# Patient Record
Sex: FEMALE | URBAN - METROPOLITAN AREA
[De-identification: names, ages, dates, MRNs, and addresses within clinical notes are randomized per-mention and may not be internally consistent; named-entity substitution may affect disease eponyms.]

---

## 2020-05-13 ENCOUNTER — APPOINTMENT (RX ONLY)
Dept: URBAN - METROPOLITAN AREA CLINIC 45 | Facility: CLINIC | Age: 45
Setting detail: DERMATOLOGY
End: 2020-05-13

## 2020-05-13 DIAGNOSIS — L65.8 OTHER SPECIFIED NONSCARRING HAIR LOSS: ICD-10-CM

## 2020-05-13 DIAGNOSIS — L63.8 OTHER ALOPECIA AREATA: ICD-10-CM

## 2020-05-13 PROCEDURE — ? TREATMENT REGIMEN

## 2020-05-13 PROCEDURE — ? COUNSELING

## 2020-05-13 PROCEDURE — ? PRESCRIPTION

## 2020-05-13 PROCEDURE — 99203 OFFICE O/P NEW LOW 30 MIN: CPT

## 2020-05-13 RX ORDER — BIMATOPROST 0.3 MG/ML
SOLUTION/ DROPS OPHTHALMIC
Qty: 1 | Refills: 3 | Status: ERX | COMMUNITY
Start: 2020-05-13

## 2020-05-13 RX ADMIN — BIMATOPROST: 0.3 SOLUTION/ DROPS OPHTHALMIC at 00:00

## 2020-05-13 NOTE — PROCEDURE: TREATMENT REGIMEN
Plan: Location: Posterior scalp\\nTreatment: Hair Loss Solution Drops (0.1% finasteride, 1% pyridoxine (bulk), 1% hydrocortisone in 7% minoxidil)\\n\\nPatient is here for hair loss on scalp.\\nPatient mentions that she has dealt with this for years, but it is now to the point she is concerned. \\nShe states that she was recently diagnosed with hashimoto’s and is in proper medication for her thyroid and she is hopeful this will help with her hair loss. \\nToday patient has circular bald patches throughout her scalp.\\nPictures taken.\\n\\nDiscussed with patient this is also an auto-immune condition usually genetic, onset by increased stress and a lowered immune system.\\nAdvised patient to that we will prescribe Hair Loss Solution Drops (0.1% finasteride, 1% pyridoxine (bulk), 1% hydrocortisone in 7% minoxidil) to help relieve inflammation.\\nAlso advised patient to try and keep stress under control in order to prevent further breakouts of hair loss.\\n\\nF/u three months
Detail Level: Zone
Plan: Location: eyelids\\nPrescribe: Bimatoprost 0.03 % drops qhs\\n\\nPt discussed that her eyelashes has been thinning out and is very bothered by it.\\nWill prescribe Bimatoprost 0.03 % drops to use nightly to help with hair density.\\nDiscussed with pt the cheapest way to get medication is through [facility].\\nPrinted pt [employer] coupon to use.\\nF/u as needed.

## 2021-03-31 ENCOUNTER — APPOINTMENT (RX ONLY)
Dept: URBAN - METROPOLITAN AREA CLINIC 45 | Facility: CLINIC | Age: 46
Setting detail: DERMATOLOGY
End: 2021-03-31

## 2021-03-31 DIAGNOSIS — D18.0 HEMANGIOMA: ICD-10-CM

## 2021-03-31 DIAGNOSIS — L85.3 XEROSIS CUTIS: ICD-10-CM

## 2021-03-31 DIAGNOSIS — L63.8 OTHER ALOPECIA AREATA: ICD-10-CM

## 2021-03-31 PROBLEM — D18.01 HEMANGIOMA OF SKIN AND SUBCUTANEOUS TISSUE: Status: ACTIVE | Noted: 2021-03-31

## 2021-03-31 PROBLEM — L65.9 NONSCARRING HAIR LOSS, UNSPECIFIED: Status: ACTIVE | Noted: 2021-03-31

## 2021-03-31 PROCEDURE — ? PRESCRIPTION

## 2021-03-31 PROCEDURE — 11104 PUNCH BX SKIN SINGLE LESION: CPT

## 2021-03-31 PROCEDURE — ? COUNSELING

## 2021-03-31 PROCEDURE — ? BIOPSY BY PUNCH METHOD

## 2021-03-31 PROCEDURE — 99213 OFFICE O/P EST LOW 20 MIN: CPT | Mod: 25

## 2021-03-31 PROCEDURE — ? TREATMENT REGIMEN

## 2021-03-31 ASSESSMENT — LOCATION SIMPLE DESCRIPTION DERM
LOCATION SIMPLE: RIGHT UPPER BACK
LOCATION SIMPLE: LEFT SCALP
LOCATION SIMPLE: LEFT UPPER BACK

## 2021-03-31 ASSESSMENT — LOCATION DETAILED DESCRIPTION DERM
LOCATION DETAILED: RIGHT MID-UPPER BACK
LOCATION DETAILED: LEFT SUPERIOR UPPER BACK
LOCATION DETAILED: LEFT MEDIAL FRONTAL SCALP

## 2021-03-31 ASSESSMENT — LOCATION ZONE DERM
LOCATION ZONE: TRUNK
LOCATION ZONE: SCALP

## 2021-03-31 NOTE — PROCEDURE: BIOPSY BY PUNCH METHOD
Detail Level: Detailed
Was A Bandage Applied: Yes
Punch Size In Mm: 6
Biopsy Type: H and E
Anesthesia Type: 1% lidocaine with epinephrine
Anesthesia Volume In Cc (Will Not Render If 0): 0.5
Additional Anesthesia Volume In Cc (Will Not Render If 0): 0
Hemostasis: None
Deep Sutures: 5-0 Vicryl
Epidermal Sutures: 5-0 Ethilon
Wound Care: Bacitracin
Dressing: bandage
Suture Removal: 7 days
Patient Will Remove Sutures At Home?: No
Lab: [id number]
Lab Facility: [id number]
Consent: Written consent was obtained and risks were reviewed including but not limited to scarring, infection, bleeding, scabbing, incomplete removal, nerve damage and allergy to anesthesia.
Post-Care Instructions: I reviewed with the patient in detail post-care instructions. Patient is to keep the biopsy site dry overnight, and then apply bacitracin twice daily until healed. Patient may apply hydrogen peroxide soaks to remove any crusting.
Home Suture Removal Text: Patient was provided a home suture removal kit and will remove their sutures at home.  If they have any questions or difficulties they will call the office.
Notification Instructions: Patient will be notified of biopsy results. However, patient instructed to call the office if not contacted within 2 weeks.
Billing Type: Third-Party Bill
Information: Selecting Yes will display possible errors in your note based on the variables you have selected. This validation is only offered as a suggestion for you. PLEASE NOTE THAT THE VALIDATION TEXT WILL BE REMOVED WHEN YOU FINALIZE YOUR NOTE. IF YOU WANT TO FAX A PRELIMINARY NOTE YOU WILL NEED TO TOGGLE THIS TO 'NO' IF YOU DO NOT WANT IT IN YOUR FAXED NOTE.

## 2021-03-31 NOTE — PROCEDURE: TREATMENT REGIMEN
Plan: Location: Posterior scalp\\nTreatment: Hair Loss Solution Drops (0.1% finasteride, 1% pyridoxine (bulk), 1% hydrocortisone in 7% minoxidil)\\n\\nPatient is here for a 1 year follow up on hair loss on scalp.\\nPatient mentions that she has finished the Hair Loss Solution drops last month.\\nShe’s hesitant on refilling her Rx due to her not liking how messy it’s when applying.\\nHowever she states that she noticed significant hair growth.\\nPictures taken.\\n\\nDiscussed with pt that upon further examination her scalp has improved significantly and hair has started to regrow.\\nDiscussed with pt that when applying the solution drops she’s to apply directly on to the scalp.\\nToday, I will perform a punch biopsy to get a definitive diagnosis.\\nDiscussed with patient this is also an auto-immune condition usually genetic, onset by increased stress and a lowered immune system.\\nAlso advised patient to try and keep stress under control in order to prevent further breakouts of hair loss.\\n\\nF/u in 2 weeks.
Detail Level: Zone

## 2021-04-15 ENCOUNTER — APPOINTMENT (RX ONLY)
Dept: URBAN - METROPOLITAN AREA CLINIC 45 | Facility: CLINIC | Age: 46
Setting detail: DERMATOLOGY
End: 2021-04-15

## 2021-04-15 DIAGNOSIS — L85.3 XEROSIS CUTIS: ICD-10-CM

## 2021-04-15 DIAGNOSIS — D18.0 HEMANGIOMA: ICD-10-CM

## 2021-04-15 DIAGNOSIS — L63.8 OTHER ALOPECIA AREATA: ICD-10-CM

## 2021-04-15 PROBLEM — L65.9 NONSCARRING HAIR LOSS, UNSPECIFIED: Status: ACTIVE | Noted: 2021-04-15

## 2021-04-15 PROBLEM — D18.01 HEMANGIOMA OF SKIN AND SUBCUTANEOUS TISSUE: Status: ACTIVE | Noted: 2021-04-15

## 2021-04-15 PROCEDURE — ? PRESCRIPTION

## 2021-04-15 PROCEDURE — ? TREATMENT REGIMEN

## 2021-04-15 PROCEDURE — ? COUNSELING

## 2021-04-15 PROCEDURE — 99213 OFFICE O/P EST LOW 20 MIN: CPT

## 2021-04-15 ASSESSMENT — LOCATION SIMPLE DESCRIPTION DERM
LOCATION SIMPLE: LEFT UPPER BACK
LOCATION SIMPLE: RIGHT UPPER BACK

## 2021-04-15 ASSESSMENT — LOCATION DETAILED DESCRIPTION DERM
LOCATION DETAILED: RIGHT MID-UPPER BACK
LOCATION DETAILED: LEFT SUPERIOR UPPER BACK

## 2021-04-15 ASSESSMENT — LOCATION ZONE DERM: LOCATION ZONE: TRUNK

## 2021-04-15 NOTE — PROCEDURE: TREATMENT REGIMEN
Plan: Location: Posterior scalp\\nTreatment: Hair Loss Solution Drops (0.1% finasteride, 1% pyridoxine (bulk), 1% hydrocortisone in 7% minoxidil)\\n\\nPatient is here for a 2 week follow up on punch biopsy and suture removal.\\nPatient mentions that she has finished the Hair Loss Solution drops last month and never filled the Rx.\\nShe’s states that she wants to continue applying the hair loss solution drops.\\nPictures taken.\\n\\nDiscussed with pt the results of her pathology came back as androgenetic alopecia.\\nDiscussed with pt that when applying the solution drops she’s to apply directly on to the scalp.\\nDiscussed with patient this is also an auto-immune condition usually genetic, onset by increased stress and a lowered immune system.\\nAlso advised patient to try and keep stress under control in order to prevent further breakouts of hair loss.\\n\\nF/u PRN
Detail Level: Zone